# Patient Record
Sex: FEMALE | Race: WHITE
[De-identification: names, ages, dates, MRNs, and addresses within clinical notes are randomized per-mention and may not be internally consistent; named-entity substitution may affect disease eponyms.]

---

## 2018-04-09 ENCOUNTER — APPOINTMENT (OUTPATIENT)
Dept: OBGYN | Facility: CLINIC | Age: 53
End: 2018-04-09
Payer: COMMERCIAL

## 2018-04-09 ENCOUNTER — RESULT REVIEW (OUTPATIENT)
Age: 53
End: 2018-04-09

## 2018-04-09 VITALS
HEIGHT: 66 IN | WEIGHT: 148 LBS | BODY MASS INDEX: 23.78 KG/M2 | SYSTOLIC BLOOD PRESSURE: 120 MMHG | DIASTOLIC BLOOD PRESSURE: 70 MMHG | RESPIRATION RATE: 16 BRPM

## 2018-04-09 DIAGNOSIS — Z12.72 ENCOUNTER FOR SCREENING FOR MALIGNANT NEOPLASM OF VAGINA: ICD-10-CM

## 2018-04-09 DIAGNOSIS — Z92.89 PERSONAL HISTORY OF OTHER MEDICAL TREATMENT: ICD-10-CM

## 2018-04-09 PROCEDURE — 82270 OCCULT BLOOD FECES: CPT

## 2018-04-09 PROCEDURE — 99396 PREV VISIT EST AGE 40-64: CPT

## 2018-04-09 RX ORDER — SIMVASTATIN 20 MG/1
20 TABLET, FILM COATED ORAL
Refills: 0 | Status: ACTIVE | COMMUNITY

## 2018-04-09 RX ORDER — LISINOPRIL AND HYDROCHLOROTHIAZIDE TABLETS 10; 12.5 MG/1; MG/1
10-12.5 TABLET ORAL
Refills: 0 | Status: ACTIVE | COMMUNITY

## 2018-04-11 LAB — CYTOLOGY CVX/VAG DOC THIN PREP: NORMAL

## 2018-05-15 ENCOUNTER — RESULT REVIEW (OUTPATIENT)
Age: 53
End: 2018-05-15

## 2019-04-01 ENCOUNTER — RESULT REVIEW (OUTPATIENT)
Age: 54
End: 2019-04-01

## 2019-04-22 ENCOUNTER — APPOINTMENT (OUTPATIENT)
Dept: OBGYN | Facility: CLINIC | Age: 54
End: 2019-04-22
Payer: COMMERCIAL

## 2019-04-22 VITALS
TEMPERATURE: 95.6 F | BODY MASS INDEX: 23.63 KG/M2 | DIASTOLIC BLOOD PRESSURE: 72 MMHG | SYSTOLIC BLOOD PRESSURE: 118 MMHG | HEIGHT: 66 IN | WEIGHT: 147 LBS | RESPIRATION RATE: 16 BRPM

## 2019-04-22 PROCEDURE — 99396 PREV VISIT EST AGE 40-64: CPT

## 2019-04-22 NOTE — PHYSICAL EXAM
[Awake] : awake [Alert] : alert [Soft] : soft [Oriented x3] : oriented to person, place, and time [Normal] : uterus [No Bleeding] : there was no active vaginal bleeding [Uterine Adnexae] : were not tender and not enlarged [Acute Distress] : no acute distress [Nipple Discharge] : no nipple discharge [Mass] : no breast mass [Tender] : non tender [Axillary LAD] : no axillary lymphadenopathy

## 2019-04-29 ENCOUNTER — RESULT REVIEW (OUTPATIENT)
Age: 54
End: 2019-04-29

## 2020-07-24 ENCOUNTER — APPOINTMENT (OUTPATIENT)
Dept: OBGYN | Facility: CLINIC | Age: 55
End: 2020-07-24

## 2020-08-20 ENCOUNTER — APPOINTMENT (OUTPATIENT)
Dept: OBGYN | Facility: CLINIC | Age: 55
End: 2020-08-20
Payer: COMMERCIAL

## 2020-08-20 ENCOUNTER — TRANSCRIPTION ENCOUNTER (OUTPATIENT)
Age: 55
End: 2020-08-20

## 2020-08-20 VITALS
BODY MASS INDEX: 23.3 KG/M2 | RESPIRATION RATE: 16 BRPM | SYSTOLIC BLOOD PRESSURE: 130 MMHG | HEIGHT: 66 IN | DIASTOLIC BLOOD PRESSURE: 70 MMHG | WEIGHT: 145 LBS

## 2020-08-20 DIAGNOSIS — N95.1 MENOPAUSAL AND FEMALE CLIMACTERIC STATES: ICD-10-CM

## 2020-08-20 PROCEDURE — 99396 PREV VISIT EST AGE 40-64: CPT

## 2021-09-13 ENCOUNTER — NON-APPOINTMENT (OUTPATIENT)
Age: 56
End: 2021-09-13

## 2021-09-28 NOTE — COUNSELING
[Nutrition] : nutrition [Exercise] : exercise [Vitamins/Supplements] : vitamins/supplements [STD (testing, results, tx)] : STD (testing, results, tx) [Weight Management] : weight management Review of Systems:  CONSTITUTIONAL: No fever  SKIN: No rash  HEMATOLOGIC: No abnormal bleeding   EYES: No eye pain, No blurred vision  ENT: No sore throat, No neck pain, No rhinorrhea  RESPIRATORY: No shortness of breath, No cough  CARDIAC: No chest pain, No palpitations  GI: No abdominal pain, No nausea, No vomiting, No diarrhea  MUSCULOSKELETAL: No joint paint, No swelling, No back pain  NEUROLOGIC: No numbness, No focal weakness, No headache, No dizziness  All other systems negative, unless specified in HPI

## 2021-10-15 ENCOUNTER — APPOINTMENT (OUTPATIENT)
Dept: OBGYN | Facility: CLINIC | Age: 56
End: 2021-10-15
Payer: COMMERCIAL

## 2021-10-15 VITALS
BODY MASS INDEX: 23.78 KG/M2 | TEMPERATURE: 97.6 F | WEIGHT: 148 LBS | HEIGHT: 66 IN | DIASTOLIC BLOOD PRESSURE: 80 MMHG | RESPIRATION RATE: 14 BRPM | HEART RATE: 75 BPM | SYSTOLIC BLOOD PRESSURE: 138 MMHG

## 2021-10-15 PROCEDURE — 99396 PREV VISIT EST AGE 40-64: CPT

## 2021-10-15 NOTE — PLAN
[FreeTextEntry1] : 55 YO FEMALE PRESENTS FOR ANNUAL GYN EXAMINATION. SELF BREAST EXAMINATION TAUGHT. MAMMOGRAM ORDERED. EXERCISE, CALCIUM AND VITAMIN D3 SUPPLEMENTATION DISCUSSED. BONE DENSITY STUDY AND INDICATIONS REVIEWED. COLONOSCOPY HISTORY REVIEWED AND DISCUSSED FOLLOWUP. WILL ORDER PELVIC SONOGRAM

## 2021-11-15 ENCOUNTER — APPOINTMENT (OUTPATIENT)
Dept: OBGYN | Facility: CLINIC | Age: 56
End: 2021-11-15
Payer: COMMERCIAL

## 2021-11-15 VITALS
SYSTOLIC BLOOD PRESSURE: 150 MMHG | RESPIRATION RATE: 14 BRPM | BODY MASS INDEX: 23.78 KG/M2 | DIASTOLIC BLOOD PRESSURE: 80 MMHG | WEIGHT: 148 LBS | HEIGHT: 66 IN | TEMPERATURE: 97.8 F | HEART RATE: 74 BPM

## 2021-11-15 DIAGNOSIS — D25.1 INTRAMURAL LEIOMYOMA OF UTERUS: ICD-10-CM

## 2021-11-15 PROCEDURE — 76830 TRANSVAGINAL US NON-OB: CPT

## 2021-11-15 PROCEDURE — 93976 VASCULAR STUDY: CPT

## 2021-11-15 PROCEDURE — 82270 OCCULT BLOOD FECES: CPT

## 2021-11-15 PROCEDURE — 99396 PREV VISIT EST AGE 40-64: CPT | Mod: 25

## 2021-11-15 NOTE — PROCEDURE
[Fibroid Uterus] : fibroid uterus [Transvaginal Ultrasound] : transvaginal ultrasound [Color Doppler Imaging] : color doppler imaging [Retroverted] : retroverted [L: ___ cm] : L: [unfilled] cm [W: ___cm] : W: [unfilled] cm [H: ___ cm] : H: [unfilled] cm [FreeTextEntry3] : retroverted uterus, no endometrial echo noted. no free fluid seen  [FreeTextEntry7] : 2.34 x 1.55 [FreeTextEntry4] : unremarkable postmenopausal study. no evidence of ovarian mass seen [FreeTextEntry8] : 2.42 x 1.48

## 2022-08-14 ENCOUNTER — NON-APPOINTMENT (OUTPATIENT)
Age: 57
End: 2022-08-14

## 2022-10-18 ENCOUNTER — APPOINTMENT (OUTPATIENT)
Dept: OBGYN | Facility: CLINIC | Age: 57
End: 2022-10-18

## 2022-10-18 VITALS
RESPIRATION RATE: 16 BRPM | SYSTOLIC BLOOD PRESSURE: 135 MMHG | BODY MASS INDEX: 25.41 KG/M2 | DIASTOLIC BLOOD PRESSURE: 72 MMHG | TEMPERATURE: 98.2 F | HEIGHT: 66 IN | HEART RATE: 80 BPM | WEIGHT: 158.13 LBS

## 2022-10-18 DIAGNOSIS — Z01.419 ENCOUNTER FOR GYNECOLOGICAL EXAMINATION (GENERAL) (ROUTINE) W/OUT ABNORMAL FINDINGS: ICD-10-CM

## 2022-10-18 PROCEDURE — 99396 PREV VISIT EST AGE 40-64: CPT

## 2022-10-18 PROCEDURE — 82270 OCCULT BLOOD FECES: CPT

## 2022-10-18 NOTE — COUNSELING
[Nutrition/ Exercise/ Weight Management] : nutrition, exercise, weight management [Body Image] : body image [Smoking Cessation] : smoking cessation [Breast Self Exam] : breast self exam [Bladder Hygiene] : bladder hygiene [Vaccines] : vaccines [Influenza Vaccine] : influenza vaccine

## 2022-10-18 NOTE — PHYSICAL EXAM
[Chaperone Declined] : Patient declined chaperone [Appropriately responsive] : appropriately responsive [Alert] : alert [No Acute Distress] : no acute distress [No Lymphadenopathy] : no lymphadenopathy [Regular Rate Rhythm] : regular rate rhythm [No Murmurs] : no murmurs [Clear to Auscultation B/L] : clear to auscultation bilaterally [Soft] : soft [Non-tender] : non-tender [Non-distended] : non-distended [No HSM] : No HSM [No Lesions] : no lesions [No Mass] : no mass [Oriented x3] : oriented x3 [Examination Of The Breasts] : a normal appearance [No Masses] : no breast masses were palpable [Labia Majora] : normal [Labia Minora] : normal [Normal rectal exam] : was normal [Uterine Adnexae] : normal [Normal Brown Stool] : was normal and brown [Occult Blood Positive] : was negative for occult blood analysis [Internal Hemorrhoid] : no internal hemorrhoids were present [External Hemorrhoid] : no external hemorrhoids were present [Skin Tags] : no residual hemorrhoidal skin tags [Normal] : was normal [None] : there was no rectal mass

## 2022-10-18 NOTE — PLAN
[FreeTextEntry1] : 58 YO FEMALE PRESENTS FOR ANNUAL GYN EXAMINATION. SELF BREAST EXAMINATION TAUGHT. MAMMOGRAM ORDERED. EXERCISE, CALCIUM AND VITAMIN D3 SUPPLEMENTATION DISCUSSED. BONE DENSITY STUDY AND INDICATIONS REVIEWED. COLONOSCOPY HISTORY REVIEWED AND DISCUSSED FOLLOWUP.

## 2022-11-07 LAB — CYTOLOGY CVX/VAG DOC THIN PREP: NORMAL

## 2022-11-15 ENCOUNTER — NON-APPOINTMENT (OUTPATIENT)
Age: 57
End: 2022-11-15

## 2023-02-16 ENCOUNTER — APPOINTMENT (OUTPATIENT)
Dept: OTOLARYNGOLOGY | Facility: CLINIC | Age: 58
End: 2023-02-16
Payer: COMMERCIAL

## 2023-02-16 ENCOUNTER — NON-APPOINTMENT (OUTPATIENT)
Age: 58
End: 2023-02-16

## 2023-02-16 VITALS — BODY MASS INDEX: 24.91 KG/M2 | TEMPERATURE: 98.2 F | WEIGHT: 155 LBS | HEIGHT: 66 IN

## 2023-02-16 DIAGNOSIS — H93.13 TINNITUS, BILATERAL: ICD-10-CM

## 2023-02-16 DIAGNOSIS — H69.83 OTHER SPECIFIED DISORDERS OF EUSTACHIAN TUBE, BILATERAL: ICD-10-CM

## 2023-02-16 DIAGNOSIS — H90.41 SENSORINEURAL HEARING LOSS, UNILATERAL, RIGHT EAR, WITH UNRESTRICTED HEARING ON THE CONTRALATERAL SIDE: ICD-10-CM

## 2023-02-16 DIAGNOSIS — H90.3 SENSORINEURAL HEARING LOSS, BILATERAL: ICD-10-CM

## 2023-02-16 PROCEDURE — 92557 COMPREHENSIVE HEARING TEST: CPT

## 2023-02-16 PROCEDURE — 99203 OFFICE O/P NEW LOW 30 MIN: CPT

## 2023-02-16 PROCEDURE — 92567 TYMPANOMETRY: CPT

## 2023-02-16 NOTE — HISTORY OF PRESENT ILLNESS
[de-identified] : co ringing constant past 6 weeks bilateral\par like cicadas\par denies hearing loss\par neg pmh re ear

## 2023-02-16 NOTE — ASSESSMENT
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

## 2023-02-16 NOTE — DATA REVIEWED
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

## 2023-02-17 ENCOUNTER — RESULT REVIEW (OUTPATIENT)
Age: 58
End: 2023-02-17

## 2023-02-25 ENCOUNTER — OUTPATIENT (OUTPATIENT)
Dept: OUTPATIENT SERVICES | Facility: HOSPITAL | Age: 58
LOS: 1 days | End: 2023-02-25
Payer: COMMERCIAL

## 2023-02-25 ENCOUNTER — APPOINTMENT (OUTPATIENT)
Dept: MRI IMAGING | Facility: HOSPITAL | Age: 58
End: 2023-02-25
Payer: COMMERCIAL

## 2023-02-25 DIAGNOSIS — H90.41 SENSORINEURAL HEARING LOSS, UNILATERAL, RIGHT EAR, WITH UNRESTRICTED HEARING ON THE CONTRALATERAL SIDE: ICD-10-CM

## 2023-02-25 PROCEDURE — 70553 MRI BRAIN STEM W/O & W/DYE: CPT

## 2023-02-25 PROCEDURE — A9579: CPT

## 2023-02-25 PROCEDURE — 70553 MRI BRAIN STEM W/O & W/DYE: CPT | Mod: 26

## 2023-02-27 ENCOUNTER — NON-APPOINTMENT (OUTPATIENT)
Age: 58
End: 2023-02-27

## 2023-03-16 ENCOUNTER — OFFICE (OUTPATIENT)
Dept: URBAN - METROPOLITAN AREA CLINIC 102 | Facility: CLINIC | Age: 58
Setting detail: OPHTHALMOLOGY
End: 2023-03-16
Payer: COMMERCIAL

## 2023-03-16 DIAGNOSIS — H40.013: ICD-10-CM

## 2023-03-16 DIAGNOSIS — H40.011: ICD-10-CM

## 2023-03-16 DIAGNOSIS — Z96.1: ICD-10-CM

## 2023-03-16 DIAGNOSIS — H25.11: ICD-10-CM

## 2023-03-16 DIAGNOSIS — H40.012: ICD-10-CM

## 2023-03-16 PROCEDURE — 76514 ECHO EXAM OF EYE THICKNESS: CPT | Performed by: OPHTHALMOLOGY

## 2023-03-16 PROCEDURE — 92250 FUNDUS PHOTOGRAPHY W/I&R: CPT | Performed by: OPHTHALMOLOGY

## 2023-03-16 PROCEDURE — 92014 COMPRE OPH EXAM EST PT 1/>: CPT | Performed by: OPHTHALMOLOGY

## 2023-03-16 ASSESSMENT — AXIALLENGTH_DERIVED
OS_AL: 24.2556
OD_AL: 24.359
OD_AL: 24.6214

## 2023-03-16 ASSESSMENT — PACHYMETRY
OS_CT_CORRECTION: -2
OD_CT_UM: 578
OS_CT_UM: 579
OD_CT_CORRECTION: -2

## 2023-03-16 ASSESSMENT — REFRACTION_MANIFEST
OS_VA1: 20/25
OS_ADD: +2.50
OD_SPHERE: -0.75
OD_VA1: 20/20
OS_VA1: 20/300
OS_CYLINDER: SPH
OD_SPHERE: -0.25
OS_CYLINDER: SPHERE
OD_CYLINDER: -0.25
OD_AXIS: 120
OS_SPHERE: -8.00
OD_AXIS: 0
OD_CYLINDER: SPH
OS_SPHERE: -0.25
OD_ADD: +2.50
OS_AXIS: 0

## 2023-03-16 ASSESSMENT — VISUAL ACUITY
OD_BCVA: 20/25
OS_BCVA: 20/25-2

## 2023-03-16 ASSESSMENT — KERATOMETRY
OD_AXISANGLE_DEGREES: 089
OS_AXISANGLE_DEGREES: 111
OS_K1POWER_DIOPTERS: 41.75
OD_K1POWER_DIOPTERS: 41.75
OD_K2POWER_DIOPTERS: 42.00
METHOD_AUTO_MANUAL: AUTO
OS_K2POWER_DIOPTERS: 42.00

## 2023-03-16 ASSESSMENT — REFRACTION_AUTOREFRACTION
OS_CYLINDER: -0.25
OS_AXIS: 062
OD_SPHERE: -0.75
OD_AXIS: 112
OD_CYLINDER: -0.50
OS_SPHERE: 0.00

## 2023-03-16 ASSESSMENT — TONOMETRY
OS_IOP_MMHG: 17
OD_IOP_MMHG: 16
OS_IOP_MMHG: 20
OD_IOP_MMHG: 21

## 2023-03-16 ASSESSMENT — CONFRONTATIONAL VISUAL FIELD TEST (CVF)
OS_FINDINGS: FULL
OD_FINDINGS: FULL

## 2023-03-16 ASSESSMENT — SPHEQUIV_DERIVED
OD_SPHEQUIV: -1
OD_SPHEQUIV: -0.375
OS_SPHEQUIV: -0.125

## 2023-10-03 ENCOUNTER — OFFICE (OUTPATIENT)
Dept: URBAN - METROPOLITAN AREA CLINIC 102 | Facility: CLINIC | Age: 58
Setting detail: OPHTHALMOLOGY
End: 2023-10-03
Payer: COMMERCIAL

## 2023-10-03 DIAGNOSIS — H40.011: ICD-10-CM

## 2023-10-03 DIAGNOSIS — H25.11: ICD-10-CM

## 2023-10-03 DIAGNOSIS — H40.012: ICD-10-CM

## 2023-10-03 DIAGNOSIS — Z96.1: ICD-10-CM

## 2023-10-03 DIAGNOSIS — H40.013: ICD-10-CM

## 2023-10-03 PROCEDURE — 92083 EXTENDED VISUAL FIELD XM: CPT | Performed by: OPHTHALMOLOGY

## 2023-10-03 PROCEDURE — 92133 CPTRZD OPH DX IMG PST SGM ON: CPT | Performed by: OPHTHALMOLOGY

## 2023-10-03 PROCEDURE — 92014 COMPRE OPH EXAM EST PT 1/>: CPT | Performed by: OPHTHALMOLOGY

## 2023-10-03 ASSESSMENT — REFRACTION_MANIFEST
OD_CYLINDER: -0.25
OS_VA1: 20/25
OD_AXIS: 120
OD_AXIS: 0
OD_ADD: +2.50
OD_SPHERE: -0.25
OS_SPHERE: -0.25
OS_ADD: +2.50
OD_SPHERE: -0.75
OS_SPHERE: -8.00
OS_AXIS: 0
OD_VA1: 20/20
OS_VA1: 20/300
OS_CYLINDER: SPH
OD_CYLINDER: SPH
OS_CYLINDER: SPHERE

## 2023-10-03 ASSESSMENT — KERATOMETRY
METHOD_AUTO_MANUAL: AUTO
OS_AXISANGLE_DEGREES: 098
OS_K1POWER_DIOPTERS: 41.50
OD_K1POWER_DIOPTERS: 41.50
OS_K2POWER_DIOPTERS: 42.00
OD_AXISANGLE_DEGREES: 095
OD_K2POWER_DIOPTERS: 42.00

## 2023-10-03 ASSESSMENT — CONFRONTATIONAL VISUAL FIELD TEST (CVF)
OS_FINDINGS: FULL
OD_FINDINGS: FULL

## 2023-10-03 ASSESSMENT — TONOMETRY
OS_IOP_MMHG: 16
OD_IOP_MMHG: 17
OS_IOP_MMHG: 19
OD_IOP_MMHG: 18

## 2023-10-03 ASSESSMENT — REFRACTION_AUTOREFRACTION
OS_AXIS: 059
OS_SPHERE: +0.25
OD_SPHERE: -1.00
OD_CYLINDER: SPH
OS_CYLINDER: -0.25
OD_AXIS: 089

## 2023-10-03 ASSESSMENT — AXIALLENGTH_DERIVED
OS_AL: 24.2013
OD_AL: 24.4081

## 2023-10-03 ASSESSMENT — SPHEQUIV_DERIVED
OS_SPHEQUIV: 0.125
OD_SPHEQUIV: -0.375

## 2023-10-03 ASSESSMENT — VISUAL ACUITY
OS_BCVA: 20/25
OD_BCVA: 20/20

## 2023-10-03 ASSESSMENT — PACHYMETRY
OS_CT_CORRECTION: -2
OD_CT_CORRECTION: -2
OD_CT_UM: 578
OS_CT_UM: 579

## 2023-12-14 ENCOUNTER — APPOINTMENT (OUTPATIENT)
Dept: OBGYN | Facility: CLINIC | Age: 58
End: 2023-12-14
Payer: COMMERCIAL

## 2023-12-14 VITALS
HEIGHT: 66 IN | SYSTOLIC BLOOD PRESSURE: 130 MMHG | DIASTOLIC BLOOD PRESSURE: 70 MMHG | WEIGHT: 152 LBS | BODY MASS INDEX: 24.43 KG/M2

## 2023-12-14 DIAGNOSIS — R92.30 INCONCLUSIVE MAMMOGRAM: ICD-10-CM

## 2023-12-14 DIAGNOSIS — R92.2 INCONCLUSIVE MAMMOGRAM: ICD-10-CM

## 2023-12-14 DIAGNOSIS — Z00.00 ENCOUNTER FOR GENERAL ADULT MEDICAL EXAMINATION W/OUT ABNORMAL FINDINGS: ICD-10-CM

## 2023-12-14 PROCEDURE — 99396 PREV VISIT EST AGE 40-64: CPT

## 2023-12-18 ENCOUNTER — NON-APPOINTMENT (OUTPATIENT)
Age: 58
End: 2023-12-18

## 2023-12-18 LAB
CYTOLOGY CVX/VAG DOC THIN PREP: ABNORMAL
HPV HIGH+LOW RISK DNA PNL CVX: NOT DETECTED

## 2023-12-21 ENCOUNTER — NON-APPOINTMENT (OUTPATIENT)
Age: 58
End: 2023-12-21

## 2023-12-27 NOTE — PLAN
[FreeTextEntry1] : 59 YO FEMALE PRESENTS FOR ANNUAL GYN EXAMINATION. SELF BREAST EXAMINATION TAUGHT. MAMMOGRAM AND BREAST SONO ORDERED. EXERCISE, CALCIUM AND VITAMIN D3 SUPPLEMENTATION DISCUSSED. BONE DENSITY STUDY AND INDICATIONS REVIEWED. COLONOSCOPY HISTORY REVIEWED AND DISCUSSED FOLLOWUP.

## 2023-12-27 NOTE — HISTORY OF PRESENT ILLNESS
[FreeTextEntry1] : 59 YO F PRESENTS FOR ANNUAL VISIT. PT FEELS WELL AND OFFERS NO COMPLAINTS.  LPAP 10/18/22: NORMAL LMAMMO 10/24/22: BIRADS-1. DENSE BREASTS LDEXA 10/24/22: OSTEOPENIA  SHX: UTERINE MYOMECTOMY

## 2024-09-26 ENCOUNTER — APPOINTMENT (OUTPATIENT)
Dept: GASTROENTEROLOGY | Facility: CLINIC | Age: 59
End: 2024-09-26
Payer: COMMERCIAL

## 2024-09-26 VITALS
DIASTOLIC BLOOD PRESSURE: 70 MMHG | HEIGHT: 66 IN | WEIGHT: 150 LBS | SYSTOLIC BLOOD PRESSURE: 140 MMHG | BODY MASS INDEX: 24.11 KG/M2

## 2024-09-26 DIAGNOSIS — Z12.11 ENCOUNTER FOR SCREENING FOR MALIGNANT NEOPLASM OF COLON: ICD-10-CM

## 2024-09-26 PROCEDURE — 99203 OFFICE O/P NEW LOW 30 MIN: CPT

## 2024-09-26 RX ORDER — ALLOPURINOL 100 MG/1
100 TABLET ORAL
Refills: 0 | Status: ACTIVE | COMMUNITY

## 2024-09-26 RX ORDER — SODIUM SULFATE, POTASSIUM SULFATE AND MAGNESIUM SULFATE 1.6; 3.13; 17.5 G/177ML; G/177ML; G/177ML
17.5-3.13-1.6 SOLUTION ORAL
Qty: 2 | Refills: 0 | Status: ACTIVE | COMMUNITY
Start: 2024-09-26 | End: 1900-01-01

## 2024-09-26 NOTE — HISTORY OF PRESENT ILLNESS
[FreeTextEntry1] : Del Frank is a 59-year-old female with PMHx of HTN presents to the office for repeat colonoscopy. Previous colonoscopy in 2019 with Dr. Bailey. Positive FMH of CRC. Denies bowel complaints, typically has bowel movements regularly without significant straining or overt bleeding such as melena or hematochezia. Denies upper GI symptoms such as GERD, nausea, vomiting, or dysphagia. Denies unintentional weight loss.

## 2024-09-26 NOTE — PHYSICAL EXAM
[Alert] : alert [Normal Voice/Communication] : normal voice/communication [Healthy Appearing] : healthy appearing [Sclera] : the sclera and conjunctiva were normal [Hearing Threshold Finger Rub Not [place]] : hearing was normal [Normal Lips/Gums] : the lips and gums were normal [Normal Appearance] : the appearance of the neck was normal [No Respiratory Distress] : no respiratory distress [Auscultation Breath Sounds / Voice Sounds] : lungs were clear to auscultation bilaterally [Heart Rate And Rhythm] : heart rate was normal and rhythm regular [Normal S1, S2] : normal S1 and S2 [Bowel Sounds] : normal bowel sounds [Abdomen Tenderness] : non-tender [Abdomen Soft] : soft [Abnormal Walk] : normal gait [Normal Color / Pigmentation] : normal skin color and pigmentation [Oriented To Time, Place, And Person] : oriented to person, place, and time

## 2024-09-26 NOTE — ASSESSMENT
[FreeTextEntry1] : 59-year-old presents for repeat colonoscopy. Positive family history of CRC.   Plan:  Colonoscopy: Pt with positive family history of CRC, due for repeat screening colonoscopy, will schedule.  Risks versus benefits as well as instructions given. Pt agrees to planned procedure. Suprep given for preparation. All questions answered.   Pt to call with any worsening symptoms. Pt agrees to plan, all questions answered. Seen with Dr. Norris.    I, Dr. Norris, personally performed the evaluation and management (E/M) services for this new patient. That E/M includes conducting the initial examination, assessing all conditions, and establishing the plan of care. Today, my NPP, Joanna Lindo, was here to observe my evaluation and management services for this patient to be followed going forward

## 2024-10-03 ENCOUNTER — OFFICE (OUTPATIENT)
Dept: URBAN - METROPOLITAN AREA CLINIC 102 | Facility: CLINIC | Age: 59
Setting detail: OPHTHALMOLOGY
End: 2024-10-03
Payer: COMMERCIAL

## 2024-10-03 DIAGNOSIS — H35.373: ICD-10-CM

## 2024-10-03 DIAGNOSIS — H25.11: ICD-10-CM

## 2024-10-03 DIAGNOSIS — H40.011: ICD-10-CM

## 2024-10-03 DIAGNOSIS — Z96.1: ICD-10-CM

## 2024-10-03 DIAGNOSIS — H40.013: ICD-10-CM

## 2024-10-03 DIAGNOSIS — H40.012: ICD-10-CM

## 2024-10-03 PROBLEM — H52.7 REFRACTIVE ERROR: Status: ACTIVE | Noted: 2024-10-03

## 2024-10-03 PROCEDURE — 92133 CPTRZD OPH DX IMG PST SGM ON: CPT | Performed by: OPHTHALMOLOGY

## 2024-10-03 PROCEDURE — 92083 EXTENDED VISUAL FIELD XM: CPT | Performed by: OPHTHALMOLOGY

## 2024-10-03 PROCEDURE — 92020 GONIOSCOPY: CPT | Performed by: OPHTHALMOLOGY

## 2024-10-03 PROCEDURE — 99214 OFFICE O/P EST MOD 30 MIN: CPT | Performed by: OPHTHALMOLOGY

## 2024-10-03 ASSESSMENT — REFRACTION_CURRENTRX
OD_SPHERE: -0.50
OS_AXIS: 100
OS_OVR_VA: 20/
OS_SPHERE: -0.50
OS_CYLINDER: -0.50
OD_AXIS: 089
OD_OVR_VA: 20/
OD_CYLINDER: -0.25

## 2024-10-03 ASSESSMENT — REFRACTION_MANIFEST
OS_CYLINDER: SPH
OS_ADD: +2.25
OS_VA1: 20/20
OD_CYLINDER: SPH
OS_SPHERE: PLANO
OS_SPHERE: -0.25
OD_SPHERE: -0.75
OD_CYLINDER: SPH
OD_AXIS: 0
OS_AXIS: 0
OS_ADD: +2.50
OD_ADD: +2.50
OD_VA1: 20/40+
OD_ADD: +2.25
OD_SPHERE: -2.25
OS_VA1: 20/25
OD_VA1: 20/20

## 2024-10-03 ASSESSMENT — REFRACTION_AUTOREFRACTION
OD_CYLINDER: -0.75
OS_CYLINDER: -0.25
OS_AXIS: 058
OD_AXIS: 119
OS_SPHERE: +0.25
OD_SPHERE: -2.00

## 2024-10-03 ASSESSMENT — VISUAL ACUITY
OS_BCVA: 20/200
OD_BCVA: 20/20

## 2024-10-03 ASSESSMENT — KERATOMETRY
OS_K1POWER_DIOPTERS: 41.50
OS_K2POWER_DIOPTERS: 41.75
OD_K2POWER_DIOPTERS: 42.00
OS_AXISANGLE_DEGREES: 101
OD_K1POWER_DIOPTERS: 41.75
METHOD_AUTO_MANUAL: AUTO
OD_AXISANGLE_DEGREES: 074

## 2024-10-03 ASSESSMENT — TONOMETRY
OS_IOP_MMHG: 21
OD_IOP_MMHG: 20
OD_IOP_MMHG: 19

## 2024-10-03 ASSESSMENT — CONFRONTATIONAL VISUAL FIELD TEST (CVF)
OS_FINDINGS: FULL
OD_FINDINGS: FULL

## 2024-10-03 ASSESSMENT — PACHYMETRY
OS_CT_CORRECTION: -2
OD_CT_UM: 578
OD_CT_CORRECTION: -2
OS_CT_UM: 579

## 2024-12-11 ENCOUNTER — APPOINTMENT (OUTPATIENT)
Dept: GASTROENTEROLOGY | Facility: AMBULATORY MEDICAL SERVICES | Age: 59
End: 2024-12-11
Payer: COMMERCIAL

## 2024-12-11 ENCOUNTER — RESULT REVIEW (OUTPATIENT)
Age: 59
End: 2024-12-11

## 2024-12-11 PROCEDURE — 45385 COLONOSCOPY W/LESION REMOVAL: CPT

## 2024-12-12 ENCOUNTER — NON-APPOINTMENT (OUTPATIENT)
Age: 59
End: 2024-12-12

## 2024-12-16 ENCOUNTER — OFFICE (OUTPATIENT)
Dept: URBAN - METROPOLITAN AREA CLINIC 102 | Facility: CLINIC | Age: 59
Setting detail: OPHTHALMOLOGY
End: 2024-12-16
Payer: COMMERCIAL

## 2024-12-16 DIAGNOSIS — H35.373: ICD-10-CM

## 2024-12-16 DIAGNOSIS — Z96.1: ICD-10-CM

## 2024-12-16 DIAGNOSIS — H40.013: ICD-10-CM

## 2024-12-16 DIAGNOSIS — H25.11: ICD-10-CM

## 2024-12-16 PROBLEM — H40.011 GLAUCOMA SUSPECT, LOW RISK 1-2 FACTORS; RIGHT EYE, LEFT EYE, BOTH EYES: Status: ACTIVE | Noted: 2024-12-16

## 2024-12-16 PROBLEM — H40.012 GLAUCOMA SUSPECT, LOW RISK 1-2 FACTORS; RIGHT EYE, LEFT EYE, BOTH EYES: Status: ACTIVE | Noted: 2024-12-16

## 2024-12-16 PROCEDURE — 92136 OPHTHALMIC BIOMETRY: CPT | Performed by: OPHTHALMOLOGY

## 2024-12-16 PROCEDURE — 99213 OFFICE O/P EST LOW 20 MIN: CPT | Performed by: OPHTHALMOLOGY

## 2024-12-16 ASSESSMENT — KERATOMETRY
OD_CYLAXISANGLE_DEGREES: 088
OS_K1POWER_DIOPTERS: 41.50
OS_K1K2_AVERAGE: 41.625
OD_K2POWER_DIOPTERS: 42.00
OS_CYLPOWER_DEGREES: 0.25
OS_AXISANGLE_DEGREES: 102
OD_K1K2_AVERAGE: 41.75
OD_K1POWER_DIOPTERS: 41.50
OD_AXISANGLE2_DEGREES: 088
OD_CYLPOWER_DEGREES: 0.5
OS_K1POWER_DIOPTERS: 41.50
OD_K1POWER_DIOPTERS: 41.50
OS_K2POWER_DIOPTERS: 41.75
OS_K2POWER_DIOPTERS: 41.75
OD_AXISANGLE_DEGREES: 088
OS_CYLAXISANGLE_DEGREES: 102
OD_AXISANGLE_DEGREES: 178
OS_AXISANGLE_DEGREES: 12
METHOD_AUTO_MANUAL: AUTO
OS_AXISANGLE2_DEGREES: 102
OD_K2POWER_DIOPTERS: 42.00

## 2024-12-16 ASSESSMENT — PACHYMETRY
OS_CT_CORRECTION: -2
OD_CT_UM: 578
OD_CT_CORRECTION: -2
OS_CT_UM: 579

## 2024-12-16 ASSESSMENT — TONOMETRY
OD_IOP_MMHG: 18
OS_IOP_MMHG: 21

## 2024-12-16 ASSESSMENT — REFRACTION_MANIFEST
OD_SPHERE: -2.50
OD_ADD: +2.50
OD_VA1: 20/50
OS_VA1: 20/20
OS_CYLINDER: SPH
OS_ADD: +2.25
OD_CYLINDER: SPH
OS_AXIS: 0
OS_ADD: +2.50
OD_AXIS: 120
OD_ADD: +2.25
OD_AXIS: 0
OD_SPHERE: -0.75
OD_CYLINDER: -0.50
OS_SPHERE: -0.25
OS_SPHERE: PLANO
OD_VA1: 20/20
OS_VA1: 20/25

## 2024-12-16 ASSESSMENT — REFRACTION_CURRENTRX
OD_SPHERE: -0.50
OD_OVR_VA: 20/
OD_AXIS: 089
OD_CYLINDER: -0.25
OS_CYLINDER: -0.50
OS_OVR_VA: 20/
OS_SPHERE: -0.50
OS_AXIS: 100

## 2024-12-16 ASSESSMENT — CONFRONTATIONAL VISUAL FIELD TEST (CVF)
OS_FINDINGS: FULL
OD_FINDINGS: FULL

## 2024-12-16 ASSESSMENT — REFRACTION_AUTOREFRACTION
OD_CYLINDER: -0.75
OS_CYLINDER: -0.25
OD_AXIS: 122
OS_SPHERE: +0.25
OS_AXIS: 086
OD_SPHERE: -2.50

## 2024-12-16 ASSESSMENT — VISUAL ACUITY
OS_BCVA: 20/200
OD_BCVA: 20/25+

## 2024-12-17 ENCOUNTER — APPOINTMENT (OUTPATIENT)
Dept: OBGYN | Facility: CLINIC | Age: 59
End: 2024-12-17
Payer: COMMERCIAL

## 2024-12-17 VITALS
DIASTOLIC BLOOD PRESSURE: 96 MMHG | HEIGHT: 66 IN | SYSTOLIC BLOOD PRESSURE: 142 MMHG | BODY MASS INDEX: 24.11 KG/M2 | WEIGHT: 150 LBS

## 2024-12-17 DIAGNOSIS — Z01.419 ENCOUNTER FOR GYNECOLOGICAL EXAMINATION (GENERAL) (ROUTINE) W/OUT ABNORMAL FINDINGS: ICD-10-CM

## 2024-12-17 PROCEDURE — 82270 OCCULT BLOOD FECES: CPT

## 2024-12-17 PROCEDURE — 99396 PREV VISIT EST AGE 40-64: CPT

## 2024-12-23 LAB — CYTOLOGY CVX/VAG DOC THIN PREP: ABNORMAL

## 2025-01-15 ENCOUNTER — ASC (OUTPATIENT)
Dept: URBAN - METROPOLITAN AREA SURGERY 8 | Facility: SURGERY | Age: 60
Setting detail: OPHTHALMOLOGY
End: 2025-01-15
Payer: COMMERCIAL

## 2025-01-15 DIAGNOSIS — H52.211: ICD-10-CM

## 2025-01-15 DIAGNOSIS — H25.11: ICD-10-CM

## 2025-01-15 PROCEDURE — 66984 XCAPSL CTRC RMVL W/O ECP: CPT | Mod: RT | Performed by: OPHTHALMOLOGY

## 2025-01-15 PROCEDURE — FEMTO PRECISION LASER CATARACT SURGERY: Mod: GY,RT | Performed by: OPHTHALMOLOGY

## 2025-01-15 ASSESSMENT — CORNEAL EDEMA CLINICAL DESCRIPTION: OD_CORNEALEDEMA: T

## 2025-01-16 ENCOUNTER — OFFICE (OUTPATIENT)
Dept: URBAN - METROPOLITAN AREA CLINIC 102 | Facility: CLINIC | Age: 60
Setting detail: OPHTHALMOLOGY
End: 2025-01-16
Payer: COMMERCIAL

## 2025-01-16 DIAGNOSIS — Z96.1: ICD-10-CM

## 2025-01-16 PROCEDURE — 99024 POSTOP FOLLOW-UP VISIT: CPT | Performed by: OPHTHALMOLOGY

## 2025-01-16 ASSESSMENT — REFRACTION_MANIFEST
OD_SPHERE: -2.50
OS_ADD: +2.25
OD_AXIS: 0
OS_SPHERE: PLANO
OS_SPHERE: -0.25
OD_SPHERE: -0.75
OD_VA1: 20/20
OD_AXIS: 120
OD_ADD: +2.25
OS_ADD: +2.50
OS_AXIS: 0
OD_CYLINDER: -0.50
OD_ADD: +2.50
OS_CYLINDER: SPH
OD_VA1: 20/50
OS_VA1: 20/25
OD_CYLINDER: SPH
OS_VA1: 20/20

## 2025-01-16 ASSESSMENT — REFRACTION_AUTOREFRACTION
OS_AXIS: 067
OS_SPHERE: +0.50
OD_SPHERE: +0.75
OD_AXIS: 113
OD_CYLINDER: -0.25
OS_CYLINDER: -0.25

## 2025-01-16 ASSESSMENT — TONOMETRY
OS_IOP_MMHG: 19
OD_IOP_MMHG: 17

## 2025-01-16 ASSESSMENT — PACHYMETRY
OS_CT_UM: 579
OD_CT_CORRECTION: -2
OD_CT_UM: 578
OS_CT_CORRECTION: -2

## 2025-01-16 ASSESSMENT — REFRACTION_CURRENTRX
OS_AXIS: 100
OD_AXIS: 089
OD_CYLINDER: -0.25
OS_SPHERE: -0.50
OD_SPHERE: -0.50
OD_OVR_VA: 20/
OS_OVR_VA: 20/
OS_CYLINDER: -0.50

## 2025-01-16 ASSESSMENT — KERATOMETRY
METHOD_AUTO_MANUAL: AUTO
OD_K2POWER_DIOPTERS: 41.50
OS_AXISANGLE_DEGREES: 117
OS_K2POWER_DIOPTERS: 41.75
OD_AXISANGLE_DEGREES: 094
OS_K1POWER_DIOPTERS: 41.50
OD_K1POWER_DIOPTERS: 41.25

## 2025-01-16 ASSESSMENT — VISUAL ACUITY
OD_BCVA: 20/25-1
OS_BCVA: 20/30-1

## 2025-01-16 ASSESSMENT — CONFRONTATIONAL VISUAL FIELD TEST (CVF)
OS_FINDINGS: FULL
OD_FINDINGS: FULL

## 2025-01-22 ENCOUNTER — OFFICE (OUTPATIENT)
Dept: URBAN - METROPOLITAN AREA CLINIC 6 | Facility: CLINIC | Age: 60
Setting detail: OPHTHALMOLOGY
End: 2025-01-22
Payer: COMMERCIAL

## 2025-01-22 DIAGNOSIS — Z96.1: ICD-10-CM

## 2025-01-22 PROCEDURE — 99024 POSTOP FOLLOW-UP VISIT: CPT

## 2025-01-22 ASSESSMENT — KERATOMETRY
OD_K2POWER_DIOPTERS: 41.75
OD_AXISANGLE_DEGREES: 072
OD_K1POWER_DIOPTERS: 41.25
OS_K1POWER_DIOPTERS: 41.50
METHOD_AUTO_MANUAL: AUTO
OS_K2POWER_DIOPTERS: 41.75
OS_AXISANGLE_DEGREES: 108

## 2025-01-22 ASSESSMENT — VISUAL ACUITY
OD_BCVA: 20/20
OS_BCVA: 20/25-1

## 2025-01-22 ASSESSMENT — REFRACTION_MANIFEST
OD_SPHERE: -2.50
OD_SPHERE: -0.75
OS_ADD: +2.50
OD_ADD: +2.50
OD_AXIS: 120
OD_CYLINDER: SPH
OD_VA1: 20/50
OS_AXIS: 0
OD_ADD: +2.25
OS_VA1: 20/20
OS_ADD: +2.25
OS_CYLINDER: SPH
OD_VA1: 20/20
OD_CYLINDER: -0.50
OS_SPHERE: PLANO
OS_VA1: 20/25
OD_AXIS: 0
OS_SPHERE: -0.25

## 2025-01-22 ASSESSMENT — REFRACTION_AUTOREFRACTION
OS_CYLINDER: -0.50
OD_SPHERE: +0.50
OS_AXIS: 068
OD_AXIS: 116
OD_CYLINDER: -0.50
OS_SPHERE: +0.50

## 2025-01-22 ASSESSMENT — PACHYMETRY
OS_CT_UM: 579
OD_CT_UM: 578
OD_CT_CORRECTION: -2
OS_CT_CORRECTION: -2

## 2025-01-22 ASSESSMENT — REFRACTION_CURRENTRX
OD_CYLINDER: -0.25
OD_SPHERE: -0.50
OS_SPHERE: -0.50
OD_OVR_VA: 20/
OS_OVR_VA: 20/
OD_AXIS: 089
OS_CYLINDER: -0.50
OS_AXIS: 100

## 2025-01-22 ASSESSMENT — CONFRONTATIONAL VISUAL FIELD TEST (CVF)
OS_FINDINGS: FULL
OD_FINDINGS: FULL

## 2025-01-22 ASSESSMENT — TONOMETRY
OD_IOP_MMHG: 16
OS_IOP_MMHG: 18

## 2025-02-10 ENCOUNTER — OFFICE (OUTPATIENT)
Dept: URBAN - METROPOLITAN AREA CLINIC 102 | Facility: CLINIC | Age: 60
Setting detail: OPHTHALMOLOGY
End: 2025-02-10
Payer: COMMERCIAL

## 2025-02-10 DIAGNOSIS — Z96.1: ICD-10-CM

## 2025-02-10 PROCEDURE — 99024 POSTOP FOLLOW-UP VISIT: CPT | Performed by: OPHTHALMOLOGY

## 2025-02-10 ASSESSMENT — REFRACTION_MANIFEST
OD_SPHERE: +0.25
OS_CYLINDER: -0.50
OD_ADD: +2.25
OS_SPHERE: PLANO
OS_ADD: +2.25
OS_SPHERE: +0.50
OS_AXIS: 078
OD_VA1: 20/50
OD_SPHERE: -2.50
OD_CYLINDER: -0.50
OD_AXIS: 131
OD_AXIS: 120
OD_CYLINDER: -0.25
OS_ADD: +2.25
OD_VA1: 20/20
OS_VA1: 20/20
OS_VA1: 20/25
OD_ADD: +2.25

## 2025-02-10 ASSESSMENT — PACHYMETRY
OS_CT_UM: 579
OD_CT_CORRECTION: -2
OS_CT_CORRECTION: -2
OD_CT_UM: 578

## 2025-02-10 ASSESSMENT — KERATOMETRY
METHOD_AUTO_MANUAL: AUTO
OS_AXISANGLE_DEGREES: 090
OS_K2POWER_DIOPTERS: 41.50
OD_AXISANGLE_DEGREES: 060
OD_K2POWER_DIOPTERS: 42.00
OS_K1POWER_DIOPTERS: 41.50
OD_K1POWER_DIOPTERS: 41.50

## 2025-02-10 ASSESSMENT — REFRACTION_AUTOREFRACTION
OS_CYLINDER: -0.50
OD_CYLINDER: -0.25
OS_SPHERE: +0.50
OD_SPHERE: +0.25
OS_AXIS: 078
OD_AXIS: 131

## 2025-02-10 ASSESSMENT — REFRACTION_CURRENTRX
OD_OVR_VA: 20/
OS_SPHERE: -0.50
OD_AXIS: 089
OD_CYLINDER: -0.25
OS_AXIS: 100
OS_OVR_VA: 20/
OD_SPHERE: -0.50
OS_CYLINDER: -0.50

## 2025-02-10 ASSESSMENT — VISUAL ACUITY
OS_BCVA: 20/20
OD_BCVA: 20/20-1

## 2025-02-10 ASSESSMENT — CONFRONTATIONAL VISUAL FIELD TEST (CVF)
OS_FINDINGS: FULL
OD_FINDINGS: FULL

## 2025-02-10 ASSESSMENT — TONOMETRY
OS_IOP_MMHG: 16
OD_IOP_MMHG: 15

## 2025-05-13 ENCOUNTER — OFFICE (OUTPATIENT)
Dept: URBAN - METROPOLITAN AREA CLINIC 102 | Facility: CLINIC | Age: 60
Setting detail: OPHTHALMOLOGY
End: 2025-05-13
Payer: COMMERCIAL

## 2025-05-13 DIAGNOSIS — Z96.1: ICD-10-CM

## 2025-05-13 DIAGNOSIS — H35.373: ICD-10-CM

## 2025-05-13 DIAGNOSIS — H40.013: ICD-10-CM

## 2025-05-13 PROBLEM — H43.393 VITREOUS FLOATERS; BOTH EYES: Status: ACTIVE | Noted: 2025-05-13

## 2025-05-13 PROCEDURE — 92014 COMPRE OPH EXAM EST PT 1/>: CPT | Performed by: OPHTHALMOLOGY

## 2025-05-13 PROCEDURE — 92134 CPTRZ OPH DX IMG PST SGM RTA: CPT | Performed by: OPHTHALMOLOGY

## 2025-05-13 ASSESSMENT — REFRACTION_MANIFEST
OS_VA1: 20/20
OD_AXIS: 120
OS_SPHERE: PLANO
OD_AXIS: 131
OS_CYLINDER: -0.50
OS_ADD: +2.25
OD_ADD: +2.25
OD_CYLINDER: -0.50
OD_SPHERE: +0.25
OD_CYLINDER: -0.25
OD_ADD: +2.25
OD_SPHERE: -2.50
OD_VA1: 20/20
OS_SPHERE: +0.50
OS_AXIS: 078
OS_VA1: 20/25
OD_VA1: 20/50
OS_ADD: +2.25

## 2025-05-13 ASSESSMENT — KERATOMETRY
OD_AXISANGLE_DEGREES: 082
OD_K2POWER_DIOPTERS: 42.25
OS_K2POWER_DIOPTERS: 41.50
OD_K1POWER_DIOPTERS: 41.50
OS_K1POWER_DIOPTERS: 41.50
OS_AXISANGLE_DEGREES: 090
METHOD_AUTO_MANUAL: AUTO

## 2025-05-13 ASSESSMENT — REFRACTION_AUTOREFRACTION
OS_SPHERE: +0.75
OS_AXIS: 073
OD_CYLINDER: -0.25
OD_AXIS: 123
OS_CYLINDER: -0.50
OD_SPHERE: 0.00

## 2025-05-13 ASSESSMENT — REFRACTION_CURRENTRX
OS_AXIS: 100
OS_OVR_VA: 20/
OD_CYLINDER: -0.25
OD_OVR_VA: 20/
OS_SPHERE: -0.50
OS_CYLINDER: -0.50
OD_SPHERE: -0.50
OD_AXIS: 089

## 2025-05-13 ASSESSMENT — PACHYMETRY
OS_CT_CORRECTION: -2
OD_CT_CORRECTION: -2
OS_CT_UM: 579
OD_CT_UM: 578

## 2025-05-13 ASSESSMENT — CONFRONTATIONAL VISUAL FIELD TEST (CVF)
OD_FINDINGS: FULL
OS_FINDINGS: FULL

## 2025-05-13 ASSESSMENT — VISUAL ACUITY
OD_BCVA: 20/30
OS_BCVA: 20/20

## 2025-05-13 ASSESSMENT — TONOMETRY
OS_IOP_MMHG: 18
OS_IOP_MMHG: 16
OD_IOP_MMHG: 14
OD_IOP_MMHG: 15

## 2025-07-15 ENCOUNTER — TRANSCRIPTION ENCOUNTER (OUTPATIENT)
Age: 60
End: 2025-07-15

## 2025-07-16 ENCOUNTER — TRANSCRIPTION ENCOUNTER (OUTPATIENT)
Age: 60
End: 2025-07-16